# Patient Record
Sex: MALE | Race: WHITE | NOT HISPANIC OR LATINO | Employment: FULL TIME | URBAN - METROPOLITAN AREA
[De-identification: names, ages, dates, MRNs, and addresses within clinical notes are randomized per-mention and may not be internally consistent; named-entity substitution may affect disease eponyms.]

---

## 2017-08-09 ENCOUNTER — HOSPITAL ENCOUNTER (EMERGENCY)
Facility: HOSPITAL | Age: 27
Discharge: HOME | End: 2017-08-09
Attending: EMERGENCY MEDICINE | Admitting: EMERGENCY MEDICINE

## 2017-08-09 VITALS
HEIGHT: 73 IN | DIASTOLIC BLOOD PRESSURE: 79 MMHG | TEMPERATURE: 98.3 F | OXYGEN SATURATION: 98 % | BODY MASS INDEX: 21.47 KG/M2 | HEART RATE: 70 BPM | SYSTOLIC BLOOD PRESSURE: 130 MMHG | WEIGHT: 162 LBS | RESPIRATION RATE: 18 BRPM

## 2017-08-09 DIAGNOSIS — H57.12 EYE PAIN, LEFT: Primary | ICD-10-CM

## 2017-08-09 DIAGNOSIS — Z02.6 ENCOUNTER FOR ASSESSMENT OF WORK-RELATED CAUSATION OF INJURY: ICD-10-CM

## 2017-08-09 DIAGNOSIS — S00.12XA: ICD-10-CM

## 2017-08-09 PROCEDURE — 99283 EMERGENCY DEPT VISIT LOW MDM: CPT
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
## 2017-08-10 NOTE — DISCHARGE INSTRUCTIONS
Patient's eye exam revealed mild bruising to the left upper eyelid.  Ophthalmic exam was normal and there was no evidence of corneal abrasion.  Symptoms of pain have almost completely resolved and patient's visual acuity is excellent.  Recommend Tylenol every 4-6 hours as needed for pain.  Patient is to return if any worsening symptoms.  He has no primary care, and we will give him accepting PCP phone numbers.    Follow up with one of the [facility] physician groups below to setup primary care. If you have trouble following up, please call Yuliya Rebolledo, our transitional care nurse, at [phone].    (Dr. Webb, Dr. Walton, Dr. Ocasio, and Dr. Moody.)  [facility], Primary Care, [phone], [address], [place] [address]    [facility], Primary Care, [phone], [address] [facility] [address] [place] [id number] [facility], Primary Care, [phone], [address] [place], [address] [facility], Primary Care, [phone], [address] [place], [address]     [place] [address] [facility], Primary Care, [phone], [address], [address] [place], [address]    [place] [address] [facility], Primary Care, [phone], [address]    [place], [address] [facility], Primary Care, [phone], [address] [place], [address] [facility], Primary Care, [phone], [address] [place], [address] [facility]) [facility], Primary Care, [phone], [address] [place], [address] [facility], Primary Care, [phone], [address], [address] [place],  [address] [place]     ([facility]) [facility], Primary Care, [phone], [address], [place], [address] [place] [address], Primary Care, [phone], [address] [place] [address]

## 2019-07-02 ENCOUNTER — HOSPITAL ENCOUNTER (EMERGENCY)
Facility: HOSPITAL | Age: 29
Discharge: HOME | End: 2019-07-02
Attending: EMERGENCY MEDICINE | Admitting: EMERGENCY MEDICINE

## 2019-07-02 VITALS
HEART RATE: 72 BPM | TEMPERATURE: 98.1 F | SYSTOLIC BLOOD PRESSURE: 135 MMHG | HEIGHT: 72 IN | BODY MASS INDEX: 21.67 KG/M2 | DIASTOLIC BLOOD PRESSURE: 78 MMHG | RESPIRATION RATE: 18 BRPM | WEIGHT: 160 LBS | OXYGEN SATURATION: 99 %

## 2019-07-02 DIAGNOSIS — H10.502 BLEPHAROCONJUNCTIVITIS OF LEFT EYE, UNSPECIFIED BLEPHAROCONJUNCTIVITIS TYPE: Primary | ICD-10-CM

## 2019-07-02 PROCEDURE — 99282 EMERGENCY DEPT VISIT SF MDM: CPT

## 2019-07-02 RX ORDER — ERYTHROMYCIN 5 MG/G
OINTMENT OPHTHALMIC EVERY 6 HOURS
Qty: 1 G | Refills: 0 | Status: SHIPPED | OUTPATIENT
Start: 2019-07-02 | End: 2019-07-06

## 2019-07-02 NOTE — DISCHARGE INSTRUCTIONS
Apply small amount erythromycin ointment as prescribed for 4 days.  FOllow up with [facility] is symptoms persist.

## 2019-07-02 NOTE — ED PROVIDER NOTES
Subjective   Dany Tejada is a 29 y.o. male who presents to the ED with complaints of left eye irritation. The patient reports that last night he felt like something was in his eye and was experiencing swelling and redness. He states that when he woke up this morning, there was white discharge around his eye and he couldn't open his eye until he wiped all the discharge off. He denies any change to his vision, cough, fever, congestion, sore throat, and rhinorrhea. He denies wearing contacts or glasses. He also denies any drug, alcohol, or tobacco use. There are no other acute complaints at this time.         History provided by:  Patient  Eye Problem   Location:  Left eye  Quality:  Foreign body sensation  Severity:  Moderate  Onset quality:  Sudden  Timing:  Constant  Chronicity:  New  Associated symptoms: discharge, redness and swelling    Associated symptoms: no nausea        Review of Systems   Constitutional: Negative for fever.   HENT: Negative for congestion, rhinorrhea and sore throat.    Eyes: Positive for discharge and redness. Negative for visual disturbance.        Left eye swelling.    Respiratory: Negative for cough.    Gastrointestinal: Negative for nausea.   Skin: Negative for rash.   Allergic/Immunologic: Negative for immunocompromised state.   Neurological: Negative for dizziness.   Hematological: Negative.    Psychiatric/Behavioral: Negative.    All other systems reviewed and are negative.      History reviewed. No pertinent past medical history.    No Known Allergies    History reviewed. No pertinent surgical history.    History reviewed. No pertinent family history.    Social History     Socioeconomic History   • Marital status: Single     Spouse name: Not on file   • Number of children: Not on file   • Years of education: Not on file   • Highest education level: Not on file   Tobacco Use   • Smoking status: Never Smoker   • Smokeless tobacco: Never Used   Substance and Sexual Activity   •  "Alcohol use: No   • Drug use: No   • Sexual activity: Defer         Objective   Physical Exam   Constitutional: He is oriented to person, place, and time. He appears well-developed and well-nourished. No distress.   HENT:   Head: Normocephalic and atraumatic.   Eyes: Conjunctivae are normal. Pupils are equal, round, and reactive to light. No scleral icterus.   Inspection with fluorescein and Wood's lamp showed no foreign body and no uptake of the dye. No abrasion seen.    Neck: Normal range of motion. Neck supple.   Cardiovascular: Normal rate, regular rhythm and normal heart sounds.   Pulmonary/Chest: Effort normal and breath sounds normal.   Abdominal: Soft. There is no tenderness.   Musculoskeletal: Normal range of motion.   Neurological: He is alert and oriented to person, place, and time.   Skin: Skin is warm and dry.   Psychiatric: He has a normal mood and affect. His behavior is normal.   Nursing note and vitals reviewed.      Procedures         ED Course     No results found for this or any previous visit (from the past 24 hour(s)).  Note: In addition to lab results from this visit, the labs listed above may include labs taken at another facility or during a different encounter within the last 24 hours. Please correlate lab times with ED admission and discharge times for further clarification of the services performed during this visit.    No orders to display     Vitals:    07/02/19 1424 07/02/19 1540   BP: 139/69 135/78   BP Location: Left arm Right arm   Patient Position: Sitting Lying   Pulse: 68 72   Resp: 16 18   Temp: 98.3 °F (36.8 °C) 98.1 °F (36.7 °C)   TempSrc: Oral Oral   SpO2: 99% 99%   Weight: 72.6 kg (160 lb)    Height: 182.9 cm (72\")      Medications - No data to display  ECG/EMG Results (last 24 hours)     ** No results found for the last 24 hours. **        No orders to display                       MDM    Final diagnoses:   Blepharoconjunctivitis of left eye, unspecified " blepharoconjunctivitis type       Documentation assistance provided by byron Rose.  Information recorded by the byron was done at my direction and has been verified and validated by me.     April Rose  07/02/19 1521       April Rose  07/02/19 1535       April Rose  07/02/19 2361       Deonte Bolanos, PA  07/02/19 6853

## 2025-04-10 ENCOUNTER — HOSPITAL ENCOUNTER (EMERGENCY)
Facility: HOSPITAL | Age: 35
Discharge: HOME | End: 2025-04-10
Attending: EMERGENCY MEDICINE
Payer: COMMERCIAL

## 2025-04-10 ENCOUNTER — APPOINTMENT (OUTPATIENT)
Dept: GENERAL RADIOLOGY | Facility: HOSPITAL | Age: 35
End: 2025-04-10
Payer: COMMERCIAL

## 2025-04-10 VITALS
SYSTOLIC BLOOD PRESSURE: 137 MMHG | OXYGEN SATURATION: 100 % | BODY MASS INDEX: 20.32 KG/M2 | HEART RATE: 101 BPM | TEMPERATURE: 99.1 F | WEIGHT: 150 LBS | RESPIRATION RATE: 16 BRPM | DIASTOLIC BLOOD PRESSURE: 79 MMHG | HEIGHT: 72 IN

## 2025-04-10 DIAGNOSIS — S61.411A LACERATION OF RIGHT HAND WITHOUT FOREIGN BODY, INITIAL ENCOUNTER: Primary | ICD-10-CM

## 2025-04-10 PROCEDURE — 63710000001 ONDANSETRON ODT 4 MG TABLET DISPERSIBLE: Performed by: EMERGENCY MEDICINE

## 2025-04-10 PROCEDURE — 25010000002 LIDOCAINE 1% - EPINEPHRINE 1:100000 1 %-1:100000 SOLUTION: Performed by: PHYSICIAN ASSISTANT

## 2025-04-10 PROCEDURE — 73130 X-RAY EXAM OF HAND: CPT

## 2025-04-10 PROCEDURE — 99283 EMERGENCY DEPT VISIT LOW MDM: CPT

## 2025-04-10 RX ORDER — ONDANSETRON 4 MG/1
4 TABLET, ORALLY DISINTEGRATING ORAL ONCE
Status: COMPLETED | OUTPATIENT
Start: 2025-04-10 | End: 2025-04-10

## 2025-04-10 RX ORDER — BACITRACIN ZINC, NEOMYCIN SULFATE AND POLYMYXIN B SULFATE 400; 5; 5000 [IU]/G; MG/G; [IU]/G
1 OINTMENT TOPICAL ONCE
Status: COMPLETED | OUTPATIENT
Start: 2025-04-10 | End: 2025-04-10

## 2025-04-10 RX ORDER — LIDOCAINE HYDROCHLORIDE AND EPINEPHRINE 10; 10 MG/ML; UG/ML
10 INJECTION, SOLUTION INFILTRATION; PERINEURAL ONCE
Status: COMPLETED | OUTPATIENT
Start: 2025-04-10 | End: 2025-04-10

## 2025-04-10 RX ADMIN — LIDOCAINE HYDROCHLORIDE AND EPINEPHRINE 10 ML: 10; 10 INJECTION, SOLUTION INFILTRATION; PERINEURAL at 20:39

## 2025-04-10 RX ADMIN — BACITRACIN ZINC, NEOMYCIN SULFATE, AND POLYMYXIN B SULFATE 1 APPLICATION: 400; 3.5; 5 OINTMENT TOPICAL at 20:40

## 2025-04-10 RX ADMIN — AMOXICILLIN AND CLAVULANATE POTASSIUM 1 TABLET: 875; 125 TABLET, FILM COATED ORAL at 20:42

## 2025-04-10 RX ADMIN — ONDANSETRON 4 MG: 4 TABLET, ORALLY DISINTEGRATING ORAL at 19:21

## 2025-04-10 NOTE — Clinical Note
[facility] EMERGENCY DEPARTMENT  [address]  [facility] [address]  Phone: [phone]    Dany Tejada was seen and treated in our emergency department on 4/10/2025.  He may return to work on 04/15/2025.         Thank you for choosing [facility].    Nader Haines, PA

## 2025-04-10 NOTE — ED PROVIDER NOTES
EMERGENCY DEPARTMENT ENCOUNTER    Pt Name: Dany Tejada  MRN: [id number]  Pt :   1990  Room Number:    Date of encounter:  4/10/2025  PCP: Provider, No Known  ED Provider: BALA Mcnulty    Historian: Patient    HPI:  Chief Complaint: Finger Laceration    Context: Dany Tejada is a 35 y.o. male who presents to the ED c/o laceration above the knuckle of his index finger on his right hand. Patient was at home when the incident occurred. During a cooking activity, while on the phone, the individual experienced an injury involving a knife. He reports that the knife was left out and he became agitated and slammed his hand not knowing the knife was present and cut his hand.  HPI     REVIEW OF SYSTEMS  A chief complaint appropriate review of systems was completed and is negative except as noted in the HPI.     PAST MEDICAL HISTORY  No past medical history on file.    PAST SURGICAL HISTORY  No past surgical history on file.    FAMILY HISTORY  No family history on file.    SOCIAL HISTORY  Social History     Socioeconomic History    Marital status: Single   Tobacco Use    Smoking status: Never    Smokeless tobacco: Never   Substance and Sexual Activity    Alcohol use: No    Drug use: No    Sexual activity: Defer       ALLERGIES  Patient has no known allergies.    PHYSICAL EXAM  Physical Exam  Vitals and nursing note reviewed.   Constitutional:       General: He is not in acute distress.     Appearance: Normal appearance. He is not ill-appearing or toxic-appearing.   HENT:      Head: Normocephalic and atraumatic.      Nose: Nose normal.      Mouth/Throat:      Mouth: Mucous membranes are moist.   Eyes:      Extraocular Movements: Extraocular movements intact.   Cardiovascular:      Rate and Rhythm: Normal rate.      Pulses: Normal pulses.   Pulmonary:      Effort: Pulmonary effort is normal.   Musculoskeletal:      Right hand: Laceration and tenderness present. Decreased strength. Normal sensation.  There is no disruption of two-point discrimination. Normal capillary refill. Normal pulse.      Left hand: Normal.        Hands:       Cervical back: Normal range of motion and neck supple.      Comments: There is a 5cm laceration at the base of the right index finger overlying the metacarpophalangeal joint with tendon exposed. See clinical photograph uploaded below for additional documentation. Strong flexion of index finger against resistance, weakness appreciated with extension against resistance.    Skin:     General: Skin is warm and dry.      Capillary Refill: Capillary refill takes less than 2 seconds.   Neurological:      General: No focal deficit present.      Mental Status: He is alert.   Psychiatric:         Mood and Affect: Mood normal.         Behavior: Behavior normal.           LAB RESULTS  No results found for this or any previous visit.    If labs were ordered, I independently reviewed the results and considered them in treating the patient.    RADIOLOGY  XR Hand 3+ View Right   Final Result   Impression:   No acute osseous abnormality      Soft tissue edema along the dorsal hand near the second metacarpophalangeal joint.            Electronically Signed: Sukhdev Angel     4/10/2025 8:03 PM EDT     Workstation ID: [id number]        [x] Radiologist's Report Reviewed:  I ordered and independently interpreted the above noted radiographic studies.  See radiologist's dictation for official interpretation.      PROCEDURES    Laceration Repair    Date/Time: 4/10/2025 9:00 PM    Performed by: Nader Haines PA  Authorized by: Gómez Alston MD    Consent:     Consent obtained:  Verbal    Consent given by:  Patient    Risks, benefits, and alternatives were discussed: yes      Risks discussed:  Infection, pain and tendon damage  Universal protocol:     Patient identity confirmed:  Verbally with patient  Anesthesia:     Anesthesia method:  Local infiltration    Local anesthetic:  Lidocaine 1% w/o  epi  Laceration details:     Location:  Hand    Hand location:  R hand, dorsum    Length (cm):  5    Depth (mm):  4  Pre-procedure details:     Preparation:  Patient was prepped and draped in usual sterile fashion and imaging obtained to evaluate for foreign bodies  Exploration:     Hemostasis achieved with:  Direct pressure    Imaging obtained: x-ray      Imaging outcome: foreign body not noted      Wound exploration: wound explored through full range of motion and entire depth of wound visualized      Wound extent: no foreign bodies/material noted, no nerve damage noted, no tendon damage noted, no underlying fracture noted and no vascular damage noted      Contaminated: no    Treatment:     Area cleansed with:  Saline and povidone-iodine    Amount of cleaning:  Standard    Irrigation volume:  50ml    Irrigation method:  Syringe  Skin repair:     Repair method:  Sutures    Suture size:  4-0    Suture material:  Nylon    Suture technique:  Simple interrupted    Number of sutures:  6  Approximation:     Approximation:  Close  Repair type:     Repair type:  Simple  Post-procedure details:     Dressing:  Antibiotic ointment and sterile dressing    Procedure completion:  Tolerated      No orders to display       MEDICATIONS GIVEN IN ER    Medications   ondansetron ODT (ZOFRAN-ODT) disintegrating tablet 4 mg (4 mg Oral Given 4/10/25 1921)   lidocaine 1% - EPINEPHrine 1:140624 (XYLOCAINE W/EPI) 1 %-1:053416 injection 10 mL (10 mL Injection Given by Other 4/10/25 2039)   Triple Antibiotic 5-400-5000 MG-UNIT ointment 1 Application (1 Application Topical Given by Other 4/10/25 2040)   amoxicillin-clavulanate (AUGMENTIN) 875-125 MG per tablet 1 tablet (1 tablet Oral Given 4/10/25 2042)       MEDICAL DECISION MAKING, PROGRESS, and CONSULTS   Medical Decision Making  35-year-old male presented to the emergency department with a laceration to the dorsum of his right hand sustained during an injury. The patient reported being  agitated while on the phone and inadvertently slamming his hand onto a knife, resulting in the laceration. On physical examination, the laceration was located below the knuckle on the dorsum of the right hand, with visible tendon exposure. The patient was neurovascularly intact distally, though mild weakness was noted with extension of the index finger against resistance. Orthopedic hand surgery was consulted, and their recommendations were implemented as documented. The wound was thoroughly inspected under bright direct light, confirming good visualization and a hemostatic site. The laceration was deemed relatively clean and required simple repair. Imaging of the right hand was performed and personally interpreted by myself, with the official radiology read confirming no acute bony abnormalities. The wound was copiously irrigated and repaired in a simple fashion, as detailed in the procedural note. The patient's TDAP vaccination was confirmed to be current. Prophylactic antibiotics were prescribed due to the mechanism of injury and potential infection risk. Post-repair assessment confirmed the patient remained neurovascularly intact distally. Strict return precautions were discussed with the patient, and follow-up was arranged with orthopedic hand surgery in the outpatient clinic on Monday, April 14, 2025.    Problems Addressed:  Laceration of right hand without foreign body, initial encounter: complicated acute illness or injury    Amount and/or Complexity of Data Reviewed  Radiology: ordered and independent interpretation performed. Decision-making details documented in ED Course.  Discussion of management or test interpretation with external provider(s): Consult with Dr. Gupta with Orthopedic hand    Risk  OTC drugs.  Prescription drug management.    Discussion below represents my analysis of pertinent findings related to patient's condition, differential diagnosis, treatment plan and final  disposition.    Differential diagnosis: Contusion, laceration, fracture, dislocation, tendon/ligamentous injury.      Additional sources  Discussed/ obtained information from independent historians:   [] Spouse  [] Parent  [] Family member  [] Friend  [] EMS   [] Other:  External (non-ED) record review:   [] Inpatient record:   [] Office record:   [] Outpatient record:   [] Prior Outpatient labs:   [] Prior Outpatient radiology:   [] Primary Care record:   [] Outside ED record:   [] Other:   Patient's care impacted by:   [] Diabetes  [] Hypertension  [] Hyperlipidemia  [] Hypothyroidism   [] Coronary Artery Disease  [] Congestive Heart Failure   [] COPD   [] Cancer   [] Obesity  [] GERD   [] Tobacco Abuse   [] Substance Abuse    [] Anxiety   [] Depression   [] Other:   Care significantly affected by Social Determinants of Health (housing and economic circumstances, unemployment)    [] Yes     [x] No   If yes, Patient's care significantly limited by  Social Determinants of Health including:   [] Inadequate housing   [] Low income   [] Alcoholism and drug addiction in family   [] Problems related to primary support group   [] Unemployment   [] Problems related to employment   [] Other Social Determinants of Health:     Orders placed during this visit:  Orders Placed This Encounter   Procedures    Laceration Repair    XR Hand 3+ View Right   I considered prescription management  with:   [] Pain medication  [] Antiviral  [x] Antibiotic: Implemented as prescribed for abx prophylaxis with hand wound   [] Other:   Rationale:    ED Course:    ED Course as of 04/10/25 2117   Thu Apr 10, 2025   1925 Vitals and Telemetry tracing was reviewed and directly interpreted by myself demonstrating blood pressure 155/81, temperature 99.1 °F, heart rate 111, respirations 16 breaths/min and oxygen saturation 99% on room air [JG]   1926 BP: 158/81 [JG]   1926 Temp: 99.1 °F (37.3 °C) [JG]   1926 Heart Rate: 111 [JG]   1926 Resp: 16 [JG]    1926 SpO2: 99 % [JG]   2015 XR Hand 3+ View Right  XR right hand personally interpreted by myself and with official read provided by radiology demonstrates no acute osseous abnormality; soft tissue edema along the dorsal hand near the second metacarpophalangeal joint. [JG]   2017 Per chart review patient is current on tetanus [JG]   2058 I have consulted with orthopedic hand who is in agreement with treatment and disposition plan that included laceration repair, placing finger in splint, prophylactic antibiotics and he will be seen in outpatient follow-up in clinic on Monday. [JG]      ED Course User Index  [JG] Nader Haines, PA          DIAGNOSIS  Final diagnoses:   Laceration of right hand without foreign body, initial encounter     DISPOSITION    ED Disposition       ED Disposition   Discharge    Condition   Stable    Comment   --           DISCHARGE    Patient discharged in stable condition.    Reviewed implications of results, diagnosis, meds, responsibility to follow up, warning signs and symptoms of possible worsening, potential complications and reasons to return to ER.    Patient/Family voiced understanding of above instructions.    Discussed plan for discharge, as there is no emergent indication for admission.  Pt/family is agreeable and understands need for follow up and possible repeat testing.  Pt/family is aware that discharge does not mean that nothing is wrong but that it indicates no emergency is currently present that requires admission and they must continue care with follow-up as given below or with a physician of their choice.     FOLLOW-UP  Reginaldo Gupta MD  [address] [id number]  [phone]    Schedule an appointment as soon as possible for a visit   [facility] directed with [facility] EMERGENCY DEPARTMENT  [address]  [facility] [address] [phone]  Go to   If  symptoms worsen         Medication List        New Prescriptions      amoxicillin-clavulanate 875-125 MG per tablet  Commonly known as: AUGMENTIN  Take 1 tablet by mouth 2 (Two) Times a Day for 5 days.               Where to Get Your Medications        These medications were sent to [facility] [id number] - [place], [place] - [address] [place] [address] - [phone]  - [phone]   [address] [place] [address]      Phone: [phone]   amoxicillin-clavulanate 875-125 MG per tablet        Please note that portions of this document were completed with voice recognition software.        Nader Haines, PA  04/10/25 4651

## 2025-04-10 NOTE — Clinical Note
[facility] EMERGENCY DEPARTMENT  [address]  [facility] [address]  Phone: [phone]    Dany Tejada was seen and treated in our emergency department on 4/10/2025.  He may return to work on 04/15/2025.         Thank you for choosing [facility].    Nader Haines, PA

## 2025-04-11 ENCOUNTER — TELEPHONE (OUTPATIENT)
Age: 35
End: 2025-04-11
Payer: COMMERCIAL

## 2025-04-11 NOTE — DISCHARGE INSTRUCTIONS
Symptomatic care is recommended with Tylenol and/or ibuprofen as needed for pain. Take all medications as prescribed and instructed. Take and complete full course of antibiotics as prescribed. Follow up with orthopedic hand as directed or return to Emergency Department with worsening of symptoms.

## 2025-04-11 NOTE — TELEPHONE ENCOUNTER
"2x calls to schedule New Patient vinh on 4/14/25 Per Dr Gupta. Recording stating \"this person is unable to take calls at this time\"      HUB ok to sched     "

## 2025-04-14 ENCOUNTER — OFFICE VISIT (OUTPATIENT)
Age: 35
End: 2025-04-14
Payer: COMMERCIAL

## 2025-04-14 VITALS
DIASTOLIC BLOOD PRESSURE: 68 MMHG | SYSTOLIC BLOOD PRESSURE: 138 MMHG | BODY MASS INDEX: 19.73 KG/M2 | HEIGHT: 72 IN | WEIGHT: 145.7 LBS

## 2025-04-14 DIAGNOSIS — S61.401A EXTENSOR TENDON LACERATION OF RIGHT HAND WITH OPEN WOUND, INITIAL ENCOUNTER: Primary | ICD-10-CM

## 2025-04-14 DIAGNOSIS — S66.821A EXTENSOR TENDON LACERATION OF RIGHT HAND WITH OPEN WOUND, INITIAL ENCOUNTER: Primary | ICD-10-CM

## 2025-04-14 PROCEDURE — 99204 OFFICE O/P NEW MOD 45 MIN: CPT | Performed by: PLASTIC SURGERY

## 2025-04-14 NOTE — PROGRESS NOTES
[facility] Orthopedic     Office Visit       Date: 04/14/2025   Patient Name: Dany Tejada  MRN: [id number]  YOB: 1990    Referring Physician: Provider, No Known     Chief Complaint:   Chief Complaint   Patient presents with    Right Hand - Pain     Laceration of right hand       History of Present Illness:   Dany Tejada is a 35 y.o. male right-hand-dominant presents with a right index finger laceration of 4 days duration.  Patient sustained laceration on the dorsal aspect of his right index finger MCP approximately 4 days ago while doing the dishes.  He noticed exposed frayed tendon at the base of the wound.  Presented emergency department underwent washout and repair of the laceration.  Reports pain is improving since the injury.  He reports he does have loss of extension of his right index finger.  He is otherwise healthy.  He works in industrial.  He denies smoking.      Subjective   Review of Systems:   Review of Systems   Constitutional:  Negative for chills, fever, unexpected weight gain and unexpected weight loss.   HENT:  Negative for congestion, postnasal drip and rhinorrhea.    Eyes:  Negative for blurred vision.   Respiratory:  Negative for shortness of breath.    Cardiovascular:  Negative for leg swelling.   Gastrointestinal:  Negative for abdominal pain, nausea and vomiting.   Genitourinary:  Negative for difficulty urinating.   Musculoskeletal:  Positive for arthralgias. Negative for gait problem, joint swelling and myalgias.   Skin:  Negative for skin lesions and wound.   Neurological:  Negative for dizziness, weakness, light-headedness and numbness.   Hematological:  Does not bruise/bleed easily.   Psychiatric/Behavioral:  Negative for depressed mood.         Pertinent review of systems per HPI.     I reviewed the patient's chief complaint, history of present illness, review of systems, past medical history,  "surgical history, family history, social history, medications and allergy list in the EMR on 04/14/2025 and agree with the findings above.    Objective    Vital Signs:   Vitals:    04/14/25 0842   BP: 138/68   Weight: 66.1 kg (145 lb 11.2 oz)   Height: 183.5 cm (72.25\")     BMI: Body mass index is 19.62 kg/m².    General Appearance: No acute distress. Alert and oriented.     Chest:  Non-labored breathing on room air. Regular rate and rhythm.    Upper Extremity Exam:    3 cm laceration over the dorsal aspect of the right index finger.  Approximately 30 degree extensor lag of the right index finger.  No improvement with tenodesis.  Flexion intact.  Sensation intact.    Fingers are warm, well-perfused with appropriate capillary refill.  Palpable radial pulse.    Sensation intact to light touch in median, radial and ulnar nerve distributions.    Motor- Fires FPL, ulnar intrinsics, EPL/EDC w/ full active and passive range of motion. Strength intact.    Non-tender except for in the areas highlighted    Imaging/Studies:   Imaging Results (Last 24 Hours)       ** No results found for the last 24 hours. **            X-ray of the right hand from 4/10/2025 independently viewed and interpreted myself demonstrates no evidence of bony abnormality.    Procedures:  Procedures    Quality Measures:   ACP:   ACP discussion was deferred.    Tobacco:   Dany Tejada  reports that he has never smoked. He has never used smokeless tobacco.      Assessment / Plan    Assessment/Plan:     There are no diagnoses linked to this encounter.     Dany Moreiramaile a 35 y.o. male who presents with:      ICD-10-CM ICD-9-CM   1. Extensor tendon laceration of right hand with open wound, initial encounter  S66.821A 882.2    S61.401A          Patient presents with a right index finger laceration concern for zone 5 extensor tendon laceration.  Discussed the patient's injury as well as treatment options.  Recommend exploration and repair of all " injured structures in the operating room.  Discussed the need for therapy and splinting after surgery.  The patient understands this and would like to proceed with surgery.    Consent-right index finger extensor tendon repair    The risks and benefits of the procedure were discussed with the patient and or appropriate guardian, which include but are not limited to the risk of bleeding, infection, neurovascular damage, post-operative stiffness, recurrence, tendon and/or ligament retears, recurrent instability, continued pain, arthritic pain, need for further revision surgeries in the future, deep venous thrombosis, and general risks from anesthesia. We also discussed the post-operative rehabilitation, the need for physical therapy, and the overall expected outcomes from the procedure. We also discussed the possible use of biologics including allograft. We allowed proper time and answered the patient's questions regarding the procedure. The patient expressed understanding. Knowing what the risks are and what the conservative treatment is, the patient elected to forgo any further conservative treatment options and proceed with the surgical intervention.      Follow Up:   Return for Follow Up after Post Op.        Reginaldo Gupta MD  [facility] Sur[facility]

## 2025-04-18 ENCOUNTER — OUTSIDE FACILITY SERVICE (OUTPATIENT)
Dept: ORTHOPEDIC SURGERY | Facility: CLINIC | Age: 35
End: 2025-04-18
Payer: COMMERCIAL

## 2025-04-18 ENCOUNTER — DOCUMENTATION (OUTPATIENT)
Age: 35
End: 2025-04-18
Payer: COMMERCIAL

## 2025-04-18 DIAGNOSIS — Z98.890 POST-OPERATIVE STATE: Primary | ICD-10-CM

## 2025-04-18 PROCEDURE — 26418 REPAIR FINGER TENDON: CPT | Performed by: PLASTIC SURGERY

## 2025-04-18 RX ORDER — ONDANSETRON 4 MG/1
4 TABLET, FILM COATED ORAL EVERY 8 HOURS PRN
Qty: 10 TABLET | Refills: 1 | Status: SHIPPED | OUTPATIENT
Start: 2025-04-18

## 2025-04-18 RX ORDER — IBUPROFEN 600 MG/1
600 TABLET, FILM COATED ORAL EVERY 6 HOURS PRN
Qty: 90 TABLET | Refills: 0 | Status: SHIPPED | OUTPATIENT
Start: 2025-04-18

## 2025-04-18 RX ORDER — ACETAMINOPHEN 325 MG/1
650 TABLET ORAL EVERY 6 HOURS PRN
Qty: 100 TABLET | Refills: 1 | Status: SHIPPED | OUTPATIENT
Start: 2025-04-18

## 2025-04-18 RX ORDER — OXYCODONE HYDROCHLORIDE 5 MG/1
5 TABLET ORAL EVERY 6 HOURS PRN
Qty: 12 TABLET | Refills: 0 | Status: SHIPPED | OUTPATIENT
Start: 2025-04-18

## 2025-04-18 RX ORDER — CEPHALEXIN 500 MG/1
500 CAPSULE ORAL EVERY 6 HOURS
Qty: 20 CAPSULE | Refills: 0 | Status: SHIPPED | OUTPATIENT
Start: 2025-04-18 | End: 2025-04-23

## 2025-04-18 NOTE — PROGRESS NOTES
DATE OF PROCEDURE: 04/18/2025    LOCATION: [facility]     PROCEDURES PERFORMED:    Right index finger zone V extensor indices propius repair CPT 41590  Finger zone 5 extensor digitorum communis repair CPT 46713  Right index finger radial sagittal band repair CPT 04009  Right index finger MCP capsule repair CPT 41076    SURGEON: Reginaldo Gupta MD      ASSISTANTS:      None          * Surgery not found *      ANESTHESIA: General    PREOPERATIVE DIAGNOSES:  Right index finger laceration     POSTOPERATIVE DIAGNOSES:    Right index finger zone V EIP laceration  Right index finger zone 5 EDC laceration  Right index finger radial sagittal band laceration  Right index finger MCP traumatic capsulotomy     ESTIMATED BLOOD LOSS: 5 mL.    SPECIMENS: None    IMPLANTS: None    COMPLICATIONS: None     INDICATIONS:  Dany Tejada is a 35 y.o. male who initially presented with right index finger dorsal laceration and loss of extension concerning for extensor tendon injury.  The risks, benefits, alternatives and potential complications of surgery were discussed with the patient including but not limited to bleeding scarring, infection, recurrence, stiffness, damage to surrounding structures or postoperative pain.  The patient understands the risks and agreed to proceed with surgery.  A surgical informed consent was signed prior to the procedure.     DESCRIPTION OF PROCEDURE:  The patient was greeted in the pre-operative holding area and the surgical site was marked and consent confirmed prior to bringing the patient to the operating room.  The patient was then taken to the operating room and a timeout was performed including the patient's name, procedure and antibiotic administration prior to patient receiving anesthesia.  The patient was positioned supine on the operative room table and a non-sterile tourniquet was applied to the right upper extremity and it was then prepped and draped in the usual  sterile fashion.      The upper extremity was exsanguinated and the tourniquet set to 250 mmHg.     Patient's previous laceration was reopened and extended distally and proximally in zigzag fashion.  Skin flaps were elevated sharply.  The underlying extensor tendons to the index finger were identified and there was a complete laceration of the right index finger EDC tendon and EIP tendon in zone 5.  The laceration also extended through the MCP dorsal There Is a Traumatic Capsulotomy of the MCP.  There Is Also a Laceration of the Radial Sagittal Band.  The Wound and MCP Joint Were Then Irrigated with Copious Amounts Normal Saline Solution.    The Dorsal MCP Capsule Was Repaired Loosely with 4-0 Ethibond in Figure-Of-Eight Fashion.    There Was a Long Oblique Laceration of the EDC Tendon.  This Was Debrided to Fresh Healthy Edges.  The EDC Tendon Was Then Repaired with 4-0 Ethibond Suture in a Combination of running locking fashion.  There is a short oblique laceration of the EIP tendon.  This was debrided to healthy edges.  The EIP tendon was then repaired primarily with 4-0 Ethibond suture in figure-of-eight fashion.  The index finger was then ranged at the MCP with no gapping of the extensor tendons.  There was ulnar subluxation of the extensor tendons due to the laceration that included the radial sagittal band.  This was repaired with 4-0 Ethibond suture in figure-of-eight fashion.    Turned down and hemostasis achieved with bipolar electrocautery.  Skin was closed with 4-0 nylon suture in a combination of simple interrupted and mattress fashion     A volar forearm-based hand splint was then applied to the right hand with the fingers in extension.    At the end of the procedure the patient was awoken from anesthesia and transferred to the PACU in stable condition.  I participated in all parts of the case.      POSTOPERATIVE PLAN:  Therapy to begin in 5 to 7 days for immediate active range of motion  Custom splint  per hand therapy zone 5 extensor tendon protocol.  As needed Tylenol ibuprofen and oxycodone for pain.  Right upper extremity elevation.    Follow-up in 2 weeks.

## 2025-04-24 ENCOUNTER — TELEPHONE (OUTPATIENT)
Age: 35
End: 2025-04-24
Payer: COMMERCIAL

## 2025-04-24 DIAGNOSIS — S61.401A EXTENSOR TENDON LACERATION OF RIGHT HAND WITH OPEN WOUND, INITIAL ENCOUNTER: Primary | ICD-10-CM

## 2025-04-24 DIAGNOSIS — S66.821A EXTENSOR TENDON LACERATION OF RIGHT HAND WITH OPEN WOUND, INITIAL ENCOUNTER: Primary | ICD-10-CM

## 2025-04-24 NOTE — TELEPHONE ENCOUNTER
Spoke with patient and asked if he ha discussed what his restrictions were or how long he would be off. He said he didn't. I let patient know that we would put it until his next post op visit so he can be re-evaluated. Pt verbalized understanding.     Pt said he has not heard from PT yet to be able to start. I told him I would reach out but it may be tomorrow before I hear back.    Clarita Limon CMA ([facility])

## 2025-04-25 ENCOUNTER — TREATMENT (OUTPATIENT)
Dept: PHYSICAL THERAPY | Facility: CLINIC | Age: 35
End: 2025-04-25
Payer: COMMERCIAL

## 2025-04-25 DIAGNOSIS — S51.001A: Primary | ICD-10-CM

## 2025-04-25 DIAGNOSIS — Z98.890 S/P TENDON REPAIR: ICD-10-CM

## 2025-04-25 DIAGNOSIS — S63.659A SAGITTAL BAND RUPTURE AT METACARPOPHALANGEAL JOINT, INITIAL ENCOUNTER: ICD-10-CM

## 2025-04-25 DIAGNOSIS — S56.521A: Primary | ICD-10-CM

## 2025-04-25 DIAGNOSIS — Z47.89 ORTHOPEDIC AFTERCARE: ICD-10-CM

## 2025-04-25 NOTE — PROGRESS NOTES
Physical Therapy Initial Evaluation and Plan of Care  [address] [place] [address]                  [phone]    Patient: Dany Tejada   : 1990  Diagnosis/ICD-10 Code:  Extensor tendon laceration of right elbow with open wound, initial encounter [S56.521A, S51.001A]  Referring practitioner: Reginaldo Gupta MD    Subjective Evaluation    History of Present Illness  Date of surgery: 2025  Mechanism of injury: Sp right index finger zone 5 extensor repair (EIP and EDC), mcp capsule, radial sagittal band repair.           Patient Occupation: [employer]  Current pain ratin  At worst pain ratin  Location: right dorsal hand  Quality: dull ache, sharp and tight  Aggravating factors: movement, lifting, repetitive movement and sleeping    Hand dominance: left    Patient Goals  Patient goals for therapy: increased motion, decreased pain, decreased edema, increased strength, independence with ADLs/IADLs, return to sport/leisure activities and return to work             Objective          Observations     Right Wrist/Hand   Positive for edema and incision.     Additional Wrist/Hand Observation Details  Dorsal scar over 2nd MCPJ. Stitches in place wild local edema.     Neurological Testing     Sensation     Wrist/Hand     Right   Intact: light touch    Active Range of Motion     Right Digits   Flexion   Index     MCP: 30 degrees    PIP: 50 degrees    DIP: 25 degrees  Extension   Index     MCP: 0 degrees    PIP: 10 degrees    DIP: 0 degrees    Additional Active Range of Motion Details  Wrist motion will be tested at later visit to protect tendon repair.   No ulnar or radial drift at MCPJ    Strength/Myotome Testing     Additional Strength Details   not tested per surgical protocol, will be tested at a later date           Assessment & Plan       Assessment  Impairments: abnormal coordination, abnormal muscle firing, abnormal muscle tone, abnormal or restricted ROM,  activity intolerance, impaired physical strength, lacks appropriate home exercise program, pain with function and weight-bearing intolerance   Functional limitations: carrying objects, lifting, pulling, pushing, uncomfortable because of pain and unable to perform repetitive tasks   Assessment details: Patient is a 35 year old male presenting with s/p right index finger EIP and ECP tendon repair, dorsal capsule repair, and radial sagittal band repair  performed on 4/18/25 following injury in kitchen with a sharp knife. Sign and symptoms are consistent with this including loss of finger and wrist motion, loss of strength, and loss of functional use of hand. No sign of extensor lag. Post operative bandages were removed today and patient was fit with custom wrist immobilization splint and yoke splint. Physical therapy is imperative for restoration of movement and use of hand while protecting tendon repair for return to work, ADLs, and recreation.   Prognosis: good  Prognosis details: Short term goals (4 weeks):  1. Patient demonstrates at least 75% of composite fist.   2. Patient able to extend right index MCP to at least 0 degrees (neutral) actively.   3. Patient is independent with initial HEP. MET     Long term goals (8 weeks):  1. Patient is able to  at least 50% of contralateral side.  2. Patient is able to begin [id number] conditioning as needed.   3. Patient is able to make full composite fist to be able to grasp and manipulate objects.     Plan  Therapy options: will be seen for skilled therapy services  Planned modality interventions: thermotherapy (paraffin bath), thermotherapy (hydrocollator packs), high voltage pulsed current (spasm management), high voltage pulsed current (pain management), cryotherapy, contrast bath immersion and electrical stimulation/Russian stimulation  Planned therapy interventions: therapeutic activities, stretching, strengthening, spinal/joint mobilization, soft tissue  mobilization, postural training, neuromuscular re-education, orthotic fitting/training, motor coordination training, manual therapy, abdominal trunk stabilization, ADL retraining, joint mobilization, IADL retraining, home exercise program, functional ROM exercises, flexibility, fine motor coordination training, compression, body mechanics training and balance/weight-bearing training  Frequency: 1x week  Duration in visits: 8  Treatment plan discussed with: patient        Manual Therapy:         mins  30507;  Therapeutic Exercise:    15     mins  54114;     Neuromuscular Colin:        mins  51908;    Therapeutic Activity:          mins  75661;     Gait Training:          mins  51300;     Ultrasound:          mins  86554;    Electrical Stimulation:        mins  19074 ( );  Dry Needling          mins self-pay  Self care   10 min    Splint fabrication      25 min (separate L-code)    Timed Treatment:   25   mins   Total Treatment:     65   mins    PT SIGNATURE: Abril Haney PT   DATE TREATMENT INITIATED: 4/25/2025    Initial Certification  Certification Period: 7/24/2025  I certify that the therapy services are furnished while this patient is under my care.  The services outlined above are required by this patient, and will be reviewed every 90 days.     PHYSICIAN: Reginaldo Gupta MD      DATE:     Please sign and return via fax to [phone].. Thank you, [facility] Physical [facility].

## 2025-04-29 ENCOUNTER — TELEPHONE (OUTPATIENT)
Age: 35
End: 2025-04-29
Payer: COMMERCIAL

## 2025-04-29 NOTE — TELEPHONE ENCOUNTER
Let pt know that his paperwork has been faxed over. Pt verbalized understanding.    Clarita Limon CMA ([facility])    
Yes

## 2025-04-30 ENCOUNTER — TREATMENT (OUTPATIENT)
Dept: PHYSICAL THERAPY | Facility: CLINIC | Age: 35
End: 2025-04-30
Payer: COMMERCIAL

## 2025-04-30 DIAGNOSIS — S56.521A: Primary | ICD-10-CM

## 2025-04-30 DIAGNOSIS — S63.659A SAGITTAL BAND RUPTURE AT METACARPOPHALANGEAL JOINT, INITIAL ENCOUNTER: ICD-10-CM

## 2025-04-30 DIAGNOSIS — Z98.890 S/P TENDON REPAIR: ICD-10-CM

## 2025-04-30 DIAGNOSIS — Z47.89 ORTHOPEDIC AFTERCARE: ICD-10-CM

## 2025-04-30 DIAGNOSIS — S51.001A: Primary | ICD-10-CM

## 2025-04-30 NOTE — PROGRESS NOTES
Physical Therapy Daily Treatment Note         [address] [place] [address]    Patient: Dany Tejada   : 1990  Diagnosis/ICD-10 Code:  Extensor tendon laceration of right elbow with open wound, initial encounter [S56.521A, S51.001A]  Referring practitioner: Reginaldo Gupta MD  Date of Initial Visit: Type: THERAPY  Noted: 2025  Today's Date: 2025  Patient seen for 2 sessions         Dany Terrell reports: Things have been going well.        Objective   See Exercise, Manual, and Modality Logs for complete treatment.       Assessment/Plan  Splint was modified to prevent skin irritation at 3rd and 4th digits today. Patient completed exercises in splints with minimal difficulty. Progress as appropriate.  Progress per Plan of Care and Progress strengthening /stabilization /functional activity           Manual Therapy:         mins  27399;  Therapeutic Exercise:    25     mins  09944;     Neuromuscular Colin:        mins  23421;    Therapeutic Activity:          mins  18553;     Gait Training:           mins  27259;     Ultrasound:          mins  94873;    Electrical Stimulation:         mins  69980 ( );  Dry Needling          mins self-pay    Timed Treatment:   25   mins   Total Treatment:     25   mins      [employer] Physical Therapist Student completed treatment under my direct supervision.     2025    Abril Haney PT  Physical Therapist

## 2025-05-01 ENCOUNTER — OFFICE VISIT (OUTPATIENT)
Age: 35
End: 2025-05-01
Payer: COMMERCIAL

## 2025-05-01 VITALS — TEMPERATURE: 97.9 F

## 2025-05-01 DIAGNOSIS — Z98.890 POST-OPERATIVE STATE: Primary | ICD-10-CM

## 2025-05-01 RX ORDER — METHIMAZOLE 10 MG/1
TABLET ORAL
COMMUNITY
Start: 2025-04-28

## 2025-05-01 NOTE — PROGRESS NOTES
[facility]     Follow-up Office Visit       Date: 05/01/2025   Patient Name: Dany Tejada  MRN: [id number]  YOB: 1990    Chief Complaint:   Chief Complaint   Patient presents with    Post-op     2 week status post -Right index finger zone V extensor indices propius repair DOS 4/18/25       History of Present Illness:   Dany Tejada is a 35 y.o. male 2 weeks status post right index finger extensor tendon and MCP capsule repair.  He is doing well since surgery.  Reports pain is well-controlled.  He has been working with physical therapy.  No other concerns.      Subjective   Review of Systems:   Review of Systems     Pertinent review of systems per HPI    I reviewed the patient's chief complaint, history of present illness, review of systems, past medical history, surgical history, family history, social history, medications and allergy list in the EMR on 05/01/2025 and agree with the findings above.    Objective    Vital Signs:   Vitals:    05/01/25 0905   Temp: 97.9 °F (36.6 °C)     BMI: There is no height or weight on file to calculate BMI.     General Appearance: No acute distress. Alert and oriented.     Chest:  Non-labored breathing on room air      Ortho Exam:  Right index finger extensor tenodesis intact.  Incision clean dry intact and healing appropriately.  Patient with limited active and passive flexion at the MCP joint.  Passive flexion to 60 degrees able to improve to 75 degrees with work.  Fingers warm and well-perfused distally  Sensation intact to light touch in the median, radial and ulnar nerve distributions    Imaging/Studies:   Imaging Results (Last 24 Hours)       ** No results found for the last 24 hours. **                Procedures:  Procedures    Quality Measures:   ACP:   ACP discussion was deferred.    Tobacco:   Dnay Tejada  reports that he has never smoked. He has never used  smokeless tobacco.    Assessment / Plan    Assessment/Plan:      Diagnosis Plan   1. Post-operative state            2 weeks status post right index finger extensor tendon, sagittal band and MCP capsule repair.  Doing well postoperatively.  He has been working with hand therapy and progressing as expected.  Recommend continue hand therapy with emphasis on passive flexion at the MCP.  Recommend follow-up with Emelia in 6 weeks for repeat range of motion check.    Follow Up:   Return in about 6 weeks (around 6/12/2025) for F/U with Emelia.        Reginaldo Gupta MD  [facility] Hand and Upper Extremity Surgeon

## 2025-05-07 ENCOUNTER — TREATMENT (OUTPATIENT)
Dept: PHYSICAL THERAPY | Facility: CLINIC | Age: 35
End: 2025-05-07
Payer: COMMERCIAL

## 2025-05-07 DIAGNOSIS — S63.659A SAGITTAL BAND RUPTURE AT METACARPOPHALANGEAL JOINT, INITIAL ENCOUNTER: ICD-10-CM

## 2025-05-07 DIAGNOSIS — Z47.89 ORTHOPEDIC AFTERCARE: ICD-10-CM

## 2025-05-07 DIAGNOSIS — S56.521A: Primary | ICD-10-CM

## 2025-05-07 DIAGNOSIS — S51.001A: Primary | ICD-10-CM

## 2025-05-07 DIAGNOSIS — Z98.890 S/P TENDON REPAIR: ICD-10-CM

## 2025-05-07 NOTE — PROGRESS NOTES
Physical Therapy Daily Treatment Note         [address] [place] [address]    Patient: Dany Tejada   : 1990  Diagnosis/ICD-10 Code:  Extensor tendon laceration of right elbow with open wound, initial encounter [S56.521A, S51.001A]  Referring practitioner: Reginaldo Gupta MD  Date of Initial Visit: Type: THERAPY  Noted: 2025  Today's Date: 2025  Patient seen for 3 sessions         Dany Terrell reports: had stitches taken out. No pain.         Objective   Approximately 10 extensor lag during place and hold. Dorsal IF MCPJ edema/enlargement.     MCPJ flexion (fingers straight) 65 deg   MCPJ joint stiffness, mildly firm end feel dorsal and volar.       See Exercise, Manual, and Modality Logs for complete treatment.       Assessment/Plan  Adding wrist AROM within yoke splint and MCPJ extension place and hold. Discussed starting scar massage to avoid scar tissue.   Progress per Plan of Care    Access Code: [email or web address]  URL: [email or web address]  Date: 2025  Prepared by: Abril Haney    Exercises  - Seated Finger DIP AROM  - 5-6 x daily - 7 x weekly - 15-20 reps  - Seated Finger PIP AROM  - 5-6 x daily - 7 x weekly - 15-20 reps  - Finger MP Flexion AROM  - 5-6 x daily - 7 x weekly - 15-20 reps  - Wrist Tendon Gliding  - 5-6 x daily - 7 x weekly - 15-20 reps  - Seated Finger MP Extension PROM  - 5-6 x daily - 7 x weekly - 10 reps  - Anterior Wrist Scar Massage  - 1-2 x daily - 7 x weekly - 4-5 min       Manual Therapy:    10     mins  14347;  Therapeutic Exercise:    25     mins  83244;     Neuromuscular Colin:        mins  83526;    Therapeutic Activity:          mins  68189;     Gait Training:          mins  37275;     Ultrasound:          mins  72015;    Electrical Stimulation:         mins  16051 ( );  Dry Needling          mins self-pay    Timed Treatment:   35   mins   Total Treatment:     35   mins    Abril Haney, PT  Physical  Therapist

## 2025-05-13 ENCOUNTER — TREATMENT (OUTPATIENT)
Dept: PHYSICAL THERAPY | Facility: CLINIC | Age: 35
End: 2025-05-13
Payer: COMMERCIAL

## 2025-05-13 DIAGNOSIS — Z98.890 S/P TENDON REPAIR: ICD-10-CM

## 2025-05-13 DIAGNOSIS — S63.659A SAGITTAL BAND RUPTURE AT METACARPOPHALANGEAL JOINT, INITIAL ENCOUNTER: ICD-10-CM

## 2025-05-13 DIAGNOSIS — Z47.89 ORTHOPEDIC AFTERCARE: ICD-10-CM

## 2025-05-13 DIAGNOSIS — S56.521A: Primary | ICD-10-CM

## 2025-05-13 DIAGNOSIS — S51.001A: Primary | ICD-10-CM

## 2025-05-13 PROCEDURE — 97530 THERAPEUTIC ACTIVITIES: CPT | Performed by: PHYSICAL THERAPIST

## 2025-05-13 PROCEDURE — 97110 THERAPEUTIC EXERCISES: CPT | Performed by: PHYSICAL THERAPIST

## 2025-05-13 PROCEDURE — 97140 MANUAL THERAPY 1/> REGIONS: CPT | Performed by: PHYSICAL THERAPIST

## 2025-05-13 NOTE — PROGRESS NOTES
Physical Therapy Daily Treatment Note         [address] [place] [address]    Patient: Dany Tejada   : 1990  Diagnosis/ICD-10 Code:  Extensor tendon laceration of right elbow with open wound, initial encounter [S56.521A, S51.001A]  Referring practitioner: Reginaldo Gupta MD  Date of Initial Visit: Type: THERAPY  Noted: 2025  Today's Date: 2025  Patient seen for 4 sessions         Dany Terrell reports: some throbbing occasionally on top of knuckle.         Objective   See Exercise, Manual, and Modality Logs for complete treatment.       Assessment/Plan  No lag with MCPJ extension or PIPJ extension. MCPJ joint mobility is fair but limited MCPJ flexion likely due to adhesive scar. Working on scar mobilization, joint mobility, active and passive MCPJ flexion in protected position and light prehensile tasks within yoke splint.   Progress per Plan of Care           Manual Therapy:    15     mins  57261;  Therapeutic Exercise:    23     mins  75834;     Neuromuscular Colin:        mins  90932;    Therapeutic Activity:     10     mins  04844;     Gait Training:           mins  27092;     Ultrasound:          mins  10697;    Electrical Stimulation:         mins  78127 (MC );  Dry Needling          mins self-pay    Timed Treatment:   48   mins   Total Treatment:     48   mins    Abril Haney PT  Physical Therapist

## 2025-05-21 ENCOUNTER — TREATMENT (OUTPATIENT)
Dept: PHYSICAL THERAPY | Facility: CLINIC | Age: 35
End: 2025-05-21
Payer: COMMERCIAL

## 2025-05-21 DIAGNOSIS — Z47.89 ORTHOPEDIC AFTERCARE: ICD-10-CM

## 2025-05-21 DIAGNOSIS — S61.209A EXTENSOR TENDON LACERATION OF FINGER WITH OPEN WOUND, INITIAL ENCOUNTER: ICD-10-CM

## 2025-05-21 DIAGNOSIS — S63.659A SAGITTAL BAND RUPTURE AT METACARPOPHALANGEAL JOINT, INITIAL ENCOUNTER: ICD-10-CM

## 2025-05-21 DIAGNOSIS — M25.641 STIFFNESS OF FINGER JOINT OF RIGHT HAND: Primary | ICD-10-CM

## 2025-05-21 DIAGNOSIS — Z98.890 S/P TENDON REPAIR: ICD-10-CM

## 2025-05-21 DIAGNOSIS — S56.429A EXTENSOR TENDON LACERATION OF FINGER WITH OPEN WOUND, INITIAL ENCOUNTER: ICD-10-CM

## 2025-05-21 NOTE — PROGRESS NOTES
Physical Therapy Daily Treatment Note         [address] [place] [address]    Patient: Dany Tejada   : 1990  Diagnosis/ICD-10 Code:  Stiffness of finger joint of right hand [M25.641]  Referring practitioner: Reginaldo Gupta MD  Date of Initial Visit: Type: THERAPY  Noted: 2025  Today's Date: 2025  Patient seen for 5 sessions         Danyleti Tejada reports: no major changes         Objective   See Exercise, Manual, and Modality Logs for complete treatment.       Assessment/Plan  Trial with US and paraffin to soften scar and loosed dorsal MCPJ capsule. Tolerated more aggressive MCPJ flexion passively. Will step down to senthil straps next week.   Progress per Plan of Care           Manual Therapy:    15     mins  01744;  Therapeutic Exercise:    23     mins  81596;     Neuromuscular Colin:        mins  47597;    Therapeutic Activity:          mins  79801;     Gait Training:           mins  22670;     Ultrasound:     8     mins  66257;    Electrical Stimulation:         mins  75536 ( );  Dry Needling          mins self-pay  Paraffin  10 min    Timed Treatment:   46   mins   Total Treatment:     56   mins    Abril Haney PT  Physical Therapist

## 2025-05-28 ENCOUNTER — TELEPHONE (OUTPATIENT)
Dept: PHYSICAL THERAPY | Facility: CLINIC | Age: 35
End: 2025-05-28

## 2025-05-28 NOTE — TELEPHONE ENCOUNTER
Caller: Dany Tejada    Relationship:  Self    PATIENT CALLED REQUESTING TO CANCEL SAME DAY APPT.    Did the patient call AFTER the start time of their scheduled appointment?  []YES  [x]NO    Was the patient's appointment rescheduled? []YES  [x]NO    Any additional information: SOMETHING CAME UP, CAN'T MAKE IT, WANTED TO RESCHEDULE BUT NOTHING IS AVAILABLE BEFORE NEXT APPT, IF SOMETHING OPENS UP PLEASE CALL PATIENT

## 2025-06-04 ENCOUNTER — TREATMENT (OUTPATIENT)
Dept: PHYSICAL THERAPY | Facility: CLINIC | Age: 35
End: 2025-06-04
Payer: COMMERCIAL

## 2025-06-04 DIAGNOSIS — S56.429A EXTENSOR TENDON LACERATION OF FINGER WITH OPEN WOUND, INITIAL ENCOUNTER: ICD-10-CM

## 2025-06-04 DIAGNOSIS — S61.209A EXTENSOR TENDON LACERATION OF FINGER WITH OPEN WOUND, INITIAL ENCOUNTER: ICD-10-CM

## 2025-06-04 DIAGNOSIS — S63.659A SAGITTAL BAND RUPTURE AT METACARPOPHALANGEAL JOINT, INITIAL ENCOUNTER: ICD-10-CM

## 2025-06-04 DIAGNOSIS — M25.641 STIFFNESS OF FINGER JOINT OF RIGHT HAND: Primary | ICD-10-CM

## 2025-06-04 DIAGNOSIS — Z47.89 ORTHOPEDIC AFTERCARE: ICD-10-CM

## 2025-06-04 DIAGNOSIS — S56.521A: ICD-10-CM

## 2025-06-04 DIAGNOSIS — S51.001A: ICD-10-CM

## 2025-06-04 DIAGNOSIS — Z98.890 S/P TENDON REPAIR: ICD-10-CM

## 2025-06-04 NOTE — PROGRESS NOTES
Physical Therapy Daily Treatment Note         [address] [place] [address]    Patient: Dany Tejada   : 1990  Diagnosis/ICD-10 Code:  Stiffness of finger joint of right hand [M25.641]  Referring practitioner: Reginaldo Gupta MD  Date of Initial Visit: Type: THERAPY  Noted: 2025  Today's Date: 2025  Patient seen for 6 sessions         Dany Terrell reports: no new issues.         Objective   Hypomobility of dorsal scar and dorsal capsule. Enlarged 2nd MCPJ.     See Exercise, Manual, and Modality Logs for complete treatment.       Assessment/Plan  Educated patient on flexion wrap for low load sustained MCPJ stretch. May decide to make turnstile flexion splint next visit. MCPJ extension seems limited by dorsal capsule and scar adhesion. Discontinuing yoke splint.   Progress per Plan of Care           Manual Therapy:    15     mins  34001;  Therapeutic Exercise:    23     mins  84678;     Neuromuscular Colin:        mins  12234;    Therapeutic Activity:          mins  47878;     Gait Training:           mins  48629;     Ultrasound:     8     mins  39641;    Electrical Stimulation:         mins  93371 ( );  Dry Needling          mins self-pay  Paraffin  10 min    Timed Treatment:   46   mins   Total Treatment:     56   mins    Abril Haney PT  Physical Therapist

## 2025-06-11 ENCOUNTER — TREATMENT (OUTPATIENT)
Dept: PHYSICAL THERAPY | Facility: CLINIC | Age: 35
End: 2025-06-11
Payer: COMMERCIAL

## 2025-06-11 DIAGNOSIS — S63.659A SAGITTAL BAND RUPTURE AT METACARPOPHALANGEAL JOINT, INITIAL ENCOUNTER: ICD-10-CM

## 2025-06-11 DIAGNOSIS — Z47.89 ORTHOPEDIC AFTERCARE: ICD-10-CM

## 2025-06-11 DIAGNOSIS — S56.521A: ICD-10-CM

## 2025-06-11 DIAGNOSIS — S56.429A EXTENSOR TENDON LACERATION OF FINGER WITH OPEN WOUND, INITIAL ENCOUNTER: ICD-10-CM

## 2025-06-11 DIAGNOSIS — S51.001A: ICD-10-CM

## 2025-06-11 DIAGNOSIS — S61.209A EXTENSOR TENDON LACERATION OF FINGER WITH OPEN WOUND, INITIAL ENCOUNTER: ICD-10-CM

## 2025-06-11 DIAGNOSIS — M25.641 STIFFNESS OF FINGER JOINT OF RIGHT HAND: Primary | ICD-10-CM

## 2025-06-11 DIAGNOSIS — Z98.890 S/P TENDON REPAIR: ICD-10-CM

## 2025-06-11 NOTE — PROGRESS NOTES
"   Physical Therapy Daily Treatment Note         [address] [place] [address]    Patient: Dany Tejada   : 1990  Diagnosis/ICD-10 Code:  Stiffness of finger joint of right hand [M25.641]  Referring practitioner: Reginaldo Gupta MD  Date of Initial Visit: Type: THERAPY  Noted: 2025  Today's Date: 2025  Patient seen for 7 sessions         Dany Tejada reports: tolerating flexion wrap fine. It gets a little sore but not \"painful\".        Objective          Active Range of Motion     Right Digits   Flexion   Index     MCP: 60 degrees    PIP: 85 degrees    DIP: 35 degrees  Extension   Index     MCP: 40 degrees      See Exercise, Manual, and Modality Logs for complete treatment.       Assessment/Plan  PIPJ flexion has improved but still struggling with passive and active MCPJ flexion and extension. Significant dorsal capsule restriction and scar. We are utilizing heat, US, scar massage, joint mobilization and low load but sustained flexion wrap.   Progress per Plan of Care           Manual Therapy:    15     mins  48088;  Therapeutic Exercise:    23     mins  06089;     Neuromuscular Colin:        mins  60656;    Therapeutic Activity:          mins  16128;     Gait Training:          mins  00154;     Ultrasound:     8     mins  16216;    Electrical Stimulation:         mins  69496 ( );  Dry Needling          mins self-pay  Paraffin  10 min    Timed Treatment:   46   mins   Total Treatment:     56   mins    Abril Haney PT  Physical Therapist                      "

## 2025-06-13 ENCOUNTER — OFFICE VISIT (OUTPATIENT)
Age: 35
End: 2025-06-13
Payer: COMMERCIAL

## 2025-06-13 DIAGNOSIS — Z98.890 POST-OPERATIVE STATE: Primary | ICD-10-CM

## 2025-06-13 NOTE — PROGRESS NOTES
[facility]opa[facility] Surgery Office Follow Up       Office Follow Up Visit       Patient Name: Dany Tejada    Chief Complaint:   Chief Complaint   Patient presents with    Post-op     6 week recheck- 8 weeks s/p Right index finger zone V extensor indices propius repair DOS 4/18/25       Referring Physician: No ref. provider found    History of Present Illness:   Dany Tejada returns to clinic today for f/up right index finger.  He reports persisting stiffness and swelling.  He reports the only pain is with PT and exercises to increase motion.  No new symptoms.      Subjective     Review of Systems   All other systems reviewed and are negative.       I have reviewed and updated the following portions of the patient's history and review of systems: allergies, current medications, past family history, past medical history, past social history, past surgical history and problem list.    Medications:   Current Outpatient Medications:     acetaminophen (Tylenol) 325 MG tablet, Take 2 tablets by mouth Every 6 (Six) Hours As Needed for Moderate Pain., Disp: 100 tablet, Rfl: 1    ibuprofen (ADVIL,MOTRIN) 600 MG tablet, Take 1 tablet by mouth Every 6 (Six) Hours As Needed for Moderate Pain., Disp: 90 tablet, Rfl: 0    methIMAzole (TAPAZOLE) 10 MG tablet, , Disp: , Rfl:     ondansetron (Zofran) 4 MG tablet, Take 1 tablet by mouth Every 8 (Eight) Hours As Needed for Nausea or Vomiting., Disp: 10 tablet, Rfl: 1    Allergies: No Known Allergies      Objective      Vital Signs: There were no vitals filed for this visit.    Ortho Exam:  Right index finger stiff with motion   Flexion   Index     MCP: 60 degrees    PIP: 85 degrees    DIP: 35 degrees  Extension   Index     MCP: 40 degrees     Results Review:  XR Hand 3+ View Right  Narrative: XR HAND 3+ VW RIGHT    Date of Exam: 4/10/2025 7:28 PM EDT    Indication: Laceration    Comparison: None available.    Findings:  No acute  fracture or malalignment. Soft tissue edema along the dorsal hand near the second metacarpal phalangeal joint, likely location of reported laceration. No radiopaque foreign body seen.    No osseous erosion or aggressive periostitis.    Joint spaces appear grossly preserved without significant degenerative change.  Impression: Impression:  No acute osseous abnormality    Soft tissue edema along the dorsal hand near the second metacarpophalangeal joint.    Electronically Signed: Sukhdev Angel    4/10/2025 8:03 PM EDT    Workstation ID: [id number]       XR Hand 3+ View Right  Result Date: 4/10/2025  Impression: No acute osseous abnormality Soft tissue edema along the dorsal hand near the second metacarpophalangeal joint. Electronically Signed: Sukhdev Angel  4/10/2025 8:03 PM EDT  Workstation ID: [id number]        Assessment / Plan      Assessment:   Diagnoses and all orders for this visit:    1. Post-operative state (Primary)        Quality Metrics:   BMI:   BMI is within normal parameters. No other follow-up for BMI required.       Tobacco:   Dany Tejada  reports that he has never smoked. He has never used smokeless tobacco.   Plan:  Patient is doing well s/p right index finger zone V extensor indices propius repair.  He continues to work on motion with Abril in PT and doing exercises on his own at home.  He reports pain only when working on motion.  He will continue with this and return to see Dr. Gupta in 4 to 6 weeks, sooner if needed.    Emelia Alfonso PA-C  [facility] Surgery    Dictated using Dragon Speech Recognition.

## 2025-06-16 ENCOUNTER — TELEPHONE (OUTPATIENT)
Age: 35
End: 2025-06-16
Payer: COMMERCIAL

## 2025-06-18 ENCOUNTER — TREATMENT (OUTPATIENT)
Dept: PHYSICAL THERAPY | Facility: CLINIC | Age: 35
End: 2025-06-18
Payer: COMMERCIAL

## 2025-06-18 DIAGNOSIS — M25.641 STIFFNESS OF FINGER JOINT OF RIGHT HAND: Primary | ICD-10-CM

## 2025-06-18 DIAGNOSIS — Z47.89 ORTHOPEDIC AFTERCARE: ICD-10-CM

## 2025-06-18 DIAGNOSIS — S63.659A SAGITTAL BAND RUPTURE AT METACARPOPHALANGEAL JOINT, INITIAL ENCOUNTER: ICD-10-CM

## 2025-06-18 DIAGNOSIS — S51.001A: ICD-10-CM

## 2025-06-18 DIAGNOSIS — S61.209A EXTENSOR TENDON LACERATION OF FINGER WITH OPEN WOUND, INITIAL ENCOUNTER: ICD-10-CM

## 2025-06-18 DIAGNOSIS — S56.521A: ICD-10-CM

## 2025-06-18 DIAGNOSIS — Z98.890 S/P TENDON REPAIR: ICD-10-CM

## 2025-06-18 DIAGNOSIS — S56.429A EXTENSOR TENDON LACERATION OF FINGER WITH OPEN WOUND, INITIAL ENCOUNTER: ICD-10-CM

## 2025-06-18 NOTE — PROGRESS NOTES
Physical Therapy Daily Treatment Note         [address] [place] [address]    Patient: Dany Tejada   : 1990  Diagnosis/ICD-10 Code:  Stiffness of finger joint of right hand [M25.641]  Referring practitioner: Reginaldo Gupta MD  Date of Initial Visit: Type: THERAPY  Noted: 2025  Today's Date: 2025  Patient seen for 8 sessions         Dany Terrell reports: can raise finger more        Objective   See Exercise, Manual, and Modality Logs for complete treatment.       Assessment/Plan  Dorsal scar does not seem to be limiting IF extension except for bulky MCPJ. Still very limited in MCPJ flexion.   Progress per Plan of Care           Manual Therapy:    23     mins  82699;  Therapeutic Exercise:    15     mins  80333;     Neuromuscular Colin:        mins  34174;    Therapeutic Activity:          mins  74173;     Gait Training:           mins  54225;     Ultrasound:     8     mins  71064;    Electrical Stimulation:         mins  49047 (MC );  Dry Needling          mins self-pay  Paraffin  10 min    Timed Treatment:   46   mins   Total Treatment:     56   mins    Abril Haney, PT  Physical Therapist

## 2025-06-24 ENCOUNTER — TELEPHONE (OUTPATIENT)
Dept: ORTHOPEDIC SURGERY | Facility: CLINIC | Age: 35
End: 2025-06-24
Payer: COMMERCIAL

## 2025-06-24 NOTE — TELEPHONE ENCOUNTER
"Attempted to call the patient back unable says \"the person you are calling is not taking calls right now\" and goes to a busy signal. Will try again to call the patient back. If patient calls back can ask to speak with Nicole.     The paperwork we have is filled out to return to work on 6/30/25 we have faxed the paperwork to his work already this morning.     Nicole Leroy   "

## 2025-06-24 NOTE — TELEPHONE ENCOUNTER
Patient called stating that his work did not receive his FMLA paperwork which are scanned in the media I informed he can come to the office and pick them up. Patient also stated he's return to work date on the fmla needs to be corrected return on the 6/30/25 and not 6/13/25. Patient saw Dr Gupta at the [facility] location     Please advise, thank you

## 2025-06-26 NOTE — TELEPHONE ENCOUNTER
Attempted to call the patient a second time. States the person isn't taking calls at this time.     Nicole Leroy

## 2025-06-26 NOTE — TELEPHONE ENCOUNTER
Attempted a third tie to call the patient states the person isn't taking calls at this time.     Nicole Leroy

## 2025-07-21 ENCOUNTER — OFFICE VISIT (OUTPATIENT)
Age: 35
End: 2025-07-21
Payer: COMMERCIAL

## 2025-07-21 VITALS
DIASTOLIC BLOOD PRESSURE: 78 MMHG | HEIGHT: 72 IN | BODY MASS INDEX: 20.78 KG/M2 | WEIGHT: 153.4 LBS | SYSTOLIC BLOOD PRESSURE: 142 MMHG

## 2025-07-21 DIAGNOSIS — Z98.890 POST-OPERATIVE STATE: Primary | ICD-10-CM

## 2025-07-21 PROCEDURE — 99213 OFFICE O/P EST LOW 20 MIN: CPT | Performed by: PLASTIC SURGERY

## 2025-07-21 NOTE — PROGRESS NOTES
"                                                                 [facility]     Follow-up Office Visit       Date: 07/21/2025   Patient Name: Dany Tejada  MRN: [id number]  YOB: 1990    Chief Complaint:   Chief Complaint   Patient presents with    Follow-up     5 week recheck - s/p Right index finger zone V extensor indices propius repair DOS 4/18/25       History of Present Illness:   Dany Tejada is a 35 y.o. male presents for follow-up of right index finger zone 5 extensor tendon repair.  He has been doing well since his last visit.  He reports no pain however does have stiffness with flexion of the index finger MCP joint.  Reports that he has completed therapy.  No other concerns      Subjective   Review of Systems:   Review of Systems     Pertinent review of systems per HPI    I reviewed the patient's chief complaint, history of present illness, review of systems, past medical history, surgical history, family history, social history, medications and allergy list in the EMR on 07/21/2025 and agree with the findings above.    Objective    Vital Signs:   Vitals:    07/21/25 1016   BP: 142/78   Weight: 69.6 kg (153 lb 6.4 oz)   Height: 183.5 cm (72.24\")     BMI: Body mass index is 20.66 kg/m².     General Appearance: No acute distress. Alert and oriented.     Chest:  Non-labored breathing on room air      Ortho Exam:  Index finger incision well-healed.  Mild swelling of the index finger MCP joint.  Extension intact.  Patient has approximately 70 degrees active and passive flexion at the MCP joint.  Fingers warm and well-perfused distally  Sensation intact to light touch in the median, radial and ulnar nerve distributions    Imaging/Studies:   Imaging Results (Last 24 Hours)       ** No results found for the last 24 hours. **                Procedures:  Procedures    Quality Measures:   ACP:   ACP discussion was deferred.    Tobacco:   Dany Tejada  reports that he has " never smoked. He has never used smokeless tobacco.    Assessment / Plan    Assessment/Plan:      Diagnosis Plan   1. Post-operative state            Patient presents with right index finger status post zone 5 extensor tendon repair.  He is doing well postoperatively however does have a significant stiffness at the MCP joint likely due to joint contracture.  We discussed treatment options including release versus ongoing home exercise program and observation.  The patient like to continue with observation and home exercise program at this time.  Recommend follow-up in 3 months.    Follow Up:   Return in about 3 months (around 10/21/2025).        Reginaldo Gupta MD  [facility]